# Patient Record
Sex: FEMALE | Race: OTHER
[De-identification: names, ages, dates, MRNs, and addresses within clinical notes are randomized per-mention and may not be internally consistent; named-entity substitution may affect disease eponyms.]

---

## 2020-04-22 ENCOUNTER — HOSPITAL ENCOUNTER (OUTPATIENT)
Dept: HOSPITAL 72 - PAN | Age: 55
Discharge: HOME | End: 2020-04-22
Payer: MEDICAID

## 2020-04-22 VITALS — SYSTOLIC BLOOD PRESSURE: 110 MMHG | DIASTOLIC BLOOD PRESSURE: 53 MMHG

## 2020-04-22 VITALS — BODY MASS INDEX: 23.22 KG/M2 | WEIGHT: 136 LBS | HEIGHT: 64 IN

## 2020-04-22 DIAGNOSIS — Z88.2: ICD-10-CM

## 2020-04-22 DIAGNOSIS — M19.90: ICD-10-CM

## 2020-04-22 DIAGNOSIS — Z85.3: ICD-10-CM

## 2020-04-22 DIAGNOSIS — R14.0: ICD-10-CM

## 2020-04-22 DIAGNOSIS — K21.9: ICD-10-CM

## 2020-04-22 DIAGNOSIS — E11.9: ICD-10-CM

## 2020-04-22 DIAGNOSIS — Z90.12: ICD-10-CM

## 2020-04-22 DIAGNOSIS — R10.9: Primary | ICD-10-CM

## 2020-04-22 DIAGNOSIS — Z88.6: ICD-10-CM

## 2020-04-22 DIAGNOSIS — Z90.89: ICD-10-CM

## 2020-04-22 NOTE — CONSULTATION
DATE OF CONSULTATION:  04/22/2020

CONSULTING PHYSICIAN:  Iván Paul MD.



REFERRING PHYSICIAN:  Dr. Hernadez.



CHIEF COMPLAINT:  Abdominal pain, bloating, elevated CEA, GERD, need for

screening colonoscopy.



PAST MEDICAL HISTORY:

1. Diabetes.

2. Left breast cancer.

3. Arthritis.

4. GERD.



PAST SURGICAL HISTORY:

1. Left mastectomy.

2. Appendectomy.



MEDICATIONS:  Please see medication reconciliation list.



FAMILY HISTORY:  Significant for diabetes.



SOCIAL HISTORY:  The patient denies any tobacco, alcohol, or IV drug

abuse.



REVIEW OF SYSTEMS:  Positive for abdominal pain, GERD, bloating.



ALLERGIES:  To aspirin, sulfa medication.



PHYSICAL EXAMINATION:

VITAL SIGNS:  Temperature 97.8, blood pressure is 110/55, pulse 71,

respirations 20.

HEENT:  Normocephalic and atraumatic.  Sclerae anicteric.

NECK:  Supple.  No evidence of obvious lymphadenopathy.

CARDIOVASCULAR:  Regular rhythm.  Plus S1, S2.

LUNGS:  Clear to auscultation bilaterally.

ABDOMEN:  Positive bowel sounds.  Soft, nontender.  No rebound.  No

guarding.  No peritoneal sign.

EXTREMITIES:  No cyanosis.  No clubbing.  No edema.



ASSESSMENT AND PLAN:

1. GERD.

2. Possible SIBO.

3. Possible IBS.

4. Need for screening colonoscopy.



PLAN:  Send stool for H. pylori.  Start the patient on omeprazole 40 mg

daily for GERD symptoms.  Start Align for possible bacterial overgrowth.

Follow CT of the abdomen and pelvis, which has been ordered by Dr. Hernadez.  Plan colonoscopy when authorization is obtained.



I want to thank, Dr. Hernadez, for this kind referral.









  ______________________________________________

  Iván Paul M.D. DR:  Kimberly

D:  04/22/2020 13:40

T:  04/22/2020 21:34

JOB#:  [id number]

CC:  Dr. Hernadez